# Patient Record
Sex: MALE | Race: WHITE | NOT HISPANIC OR LATINO | Employment: OTHER | URBAN - METROPOLITAN AREA
[De-identification: names, ages, dates, MRNs, and addresses within clinical notes are randomized per-mention and may not be internally consistent; named-entity substitution may affect disease eponyms.]

---

## 2019-08-02 ENCOUNTER — APPOINTMENT (OUTPATIENT)
Dept: GENERAL RADIOLOGY | Facility: HOSPITAL | Age: 24
End: 2019-08-02

## 2019-08-02 ENCOUNTER — HOSPITAL ENCOUNTER (EMERGENCY)
Facility: HOSPITAL | Age: 24
Discharge: HOME | End: 2019-08-02
Attending: EMERGENCY MEDICINE | Admitting: EMERGENCY MEDICINE

## 2019-08-02 VITALS
HEIGHT: 73 IN | BODY MASS INDEX: 22.72 KG/M2 | HEART RATE: 87 BPM | DIASTOLIC BLOOD PRESSURE: 65 MMHG | OXYGEN SATURATION: 98 % | SYSTOLIC BLOOD PRESSURE: 127 MMHG | WEIGHT: 171.4 LBS | RESPIRATION RATE: 20 BRPM | TEMPERATURE: 98.3 F

## 2019-08-02 DIAGNOSIS — S81.812A LACERATION OF LEFT LOWER EXTREMITY, INITIAL ENCOUNTER: Primary | ICD-10-CM

## 2019-08-02 PROCEDURE — 90715 TDAP VACCINE 7 YRS/> IM: CPT

## 2019-08-02 PROCEDURE — 73590 X-RAY EXAM OF LOWER LEG: CPT

## 2019-08-02 PROCEDURE — 25010000002 TDAP 5-2.5-18.5 LF-MCG/0.5 SUSPENSION

## 2019-08-02 PROCEDURE — 12002 RPR S/N/AX/GEN/TRNK2.6-7.5CM: CPT | Performed by: PHYSICIAN ASSISTANT

## 2019-08-02 PROCEDURE — 90471 IMMUNIZATION ADMIN: CPT

## 2019-08-02 PROCEDURE — 99283 EMERGENCY DEPT VISIT LOW MDM: CPT

## 2019-08-02 RX ORDER — NAPROXEN 250 MG/1
500 TABLET ORAL ONCE
Status: COMPLETED | OUTPATIENT
Start: 2019-08-02 | End: 2019-08-02

## 2019-08-02 RX ORDER — NAPROXEN 250 MG/1
TABLET ORAL
Status: DISCONTINUED
Start: 2019-08-02 | End: 2019-08-02 | Stop reason: HOSPADM

## 2019-08-02 RX ORDER — LIDOCAINE HYDROCHLORIDE AND EPINEPHRINE 10; 10 MG/ML; UG/ML
10 INJECTION, SOLUTION INFILTRATION; PERINEURAL ONCE
Status: COMPLETED | OUTPATIENT
Start: 2019-08-02 | End: 2019-08-02

## 2019-08-02 RX ORDER — NAPROXEN 500 MG/1
500 TABLET ORAL 2 TIMES DAILY PRN
Qty: 20 TABLET | Refills: 0 | Status: SHIPPED | OUTPATIENT
Start: 2019-08-02

## 2019-08-02 RX ADMIN — LIDOCAINE HYDROCHLORIDE,EPINEPHRINE BITARTRATE 10 ML: 10; .01 INJECTION, SOLUTION INFILTRATION; PERINEURAL at 13:33

## 2019-08-02 RX ADMIN — NAPROXEN 500 MG: 250 TABLET ORAL at 13:26

## 2019-08-02 RX ADMIN — TETANUS TOXOID, REDUCED DIPHTHERIA TOXOID AND ACELLULAR PERTUSSIS VACCINE, ADSORBED 0.5 ML: 5; 2.5; 8; 8; 2.5 SUSPENSION INTRAMUSCULAR at 12:08

## 2019-08-02 RX ADMIN — Medication: at 13:34

## 2019-08-02 NOTE — DISCHARGE INSTRUCTIONS
Return to the emergency department with worsening symptoms, uncontrolled pain, inability to tolerate oral liquids, fever greater than 101° F not controlled by Tylenol or as needed with emergent concerns.    Keep clean at least twice a day with antibacterial soap and water.  No soaking in water.

## 2019-08-02 NOTE — ED PROVIDER NOTES
Subjective   History of Present Illness  History of Present Illness    Chief complaint: laceration    Location: L lower leg    Quality/Severity:  Stings, moderate    Timing/Duration: 30 min pta    Modifying Factors: none    Associated Symptoms: Denies numbness or tingling.  Denies dizziness.  Denies active bleeding.  Denies difficulty walking.    Narrative: 24-year-old male presents after he was cut by a piece of galvanized sheet metal at a friend's house.  He is not sure if there could be metal pieces in there.  He is not up-to-date on tetanus. he is not anticoagulated.    Review of Systems   Respiratory: Negative.    Cardiovascular: Negative.    Gastrointestinal: Negative.    Musculoskeletal: Negative.    Skin: Positive for wound.   Neurological: Negative.  Negative for numbness.   Hematological: Negative.  Does not bruise/bleed easily.   All other systems reviewed and are negative.      History reviewed. No pertinent past medical history.    No Known Allergies    Past Surgical History:   Procedure Laterality Date   • CLAVICLE SURGERY     • FINGER SURGERY         History reviewed. No pertinent family history.    Social History     Socioeconomic History   • Marital status: Single     Spouse name: Not on file   • Number of children: Not on file   • Years of education: Not on file   • Highest education level: Not on file   Tobacco Use   • Smoking status: Current Every Day Smoker     Packs/day: 1.00     Types: Cigarettes   Substance and Sexual Activity   • Alcohol use: Yes     Alcohol/week: 0.6 oz     Types: 1 Cans of beer per week   • Drug use: No   • Sexual activity: Defer     No current facility-administered medications for this encounter.     Current Outpatient Medications:   •  Omega-3 Fatty Acids (OMEGA-3 FISH OIL PO), Take  by mouth., Disp: , Rfl:         Objective   Physical Exam  Vitals:    08/02/19 1159   BP: 127/65   Pulse: 87   Resp: 20   Temp: 98.3 °F (36.8 °C)   SpO2: 98%     GENERAL: Alert and oriented  ×4.  No apparent distress.  SKIN: Warm, pink and dry.  4 cm curved laceration to the anterior distal lower leg.  No active bleeding.  No erythema or ecchymosis.  HEENT: Atraumatic normocephalic  LUNGS: Clear to auscultation bilaterally without wheezes, rales or rhonchi  CARDIAC: Regular rate and rhythm.  S1 and S2.  No murmurs, rubs or gallops.  ABD: Soft, nontender  M/S: MAEW, no deformity  PSYCH: Normal mood and affect    Laceration Repair  Date/Time: 8/2/2019 1:00 PM  Performed by: Ashwini Price PA-C  Authorized by: Arya Benz MD     Consent:     Consent obtained:  Verbal    Consent given by:  Patient    Risks discussed:  Infection, need for additional repair, nerve damage, poor wound healing, poor cosmetic result, pain, retained foreign body, tendon damage and vascular damage    Alternatives discussed:  Observation, referral, delayed treatment and no treatment  Anesthesia (see MAR for exact dosages):     Anesthesia method:  Local infiltration    Local anesthetic:  Lidocaine 1% WITH epi  Laceration details:     Location:  Leg    Leg location:  L lower leg    Length (cm):  4    Depth (mm):  3  Repair type:     Repair type:  Simple  Pre-procedure details:     Preparation:  Patient was prepped and draped in usual sterile fashion and imaging obtained to evaluate for foreign bodies  Exploration:     Hemostasis achieved with:  Direct pressure and epinephrine    Wound exploration: wound explored through full range of motion and entire depth of wound probed and visualized      Wound extent: no fascia violation noted, no foreign bodies/material noted, no muscle damage noted, no nerve damage noted, no tendon damage noted, no underlying fracture noted and no vascular damage noted      Contaminated: no    Treatment:     Wound cleansed with: chlorhexadine.    Amount of cleaning:  Standard    Irrigation solution:  Sterile saline    Irrigation volume:  450    Irrigation method:  Pressure wash  Skin repair:     Repair  method:  Sutures    Suture size:  4-0    Suture material:  Nylon    Suture technique:  Simple interrupted    Number of sutures:  11  Approximation:     Approximation:  Close  Post-procedure details:     Dressing:  Non-adherent dressing and antibiotic ointment    Patient tolerance of procedure:  Tolerated well, no immediate complications                 ED Course    Tdap given    Reviewed tib/fib xray. Independently viewed by me. Interpreted by radiologist. Discussed with pt.  Xr Tibia Fibula 2 View Left    Result Date: 8/2/2019  Narrative: XR TIBIA FIBULA 2 VW LEFT-: 8/2/2019 12:30 PM  INDICATION: Laceration to lower leg from a piece of metal 30 minutes ago.  COMPARISON: None available.  FINDINGS: 2 views of the left tibia/fibula.  No fracture or dislocation.  No bone erosion or destruction. Articulation at the knee and ankle is anatomic..  No foreign body. There is a large laceration visible anteriorly and from the distal tibia      Impression: Soft tissue laceration is visible in the distal anterior soft tissues otherwise study is normal.  This report was finalized on 8/2/2019 12:57 PM by Dr. Abiodun Nguyen MD.      Education given.    Discussed pertinent labs and imaging findings with the patient/family.  Patient/Family voiced understanding of need to follow-up for recheck, further testing as needed.  Return to the emergency Department warnings were given.              MDM  Number of Diagnoses or Management Options  Laceration of left lower extremity, initial encounter: new and requires workup     Amount and/or Complexity of Data Reviewed  Tests in the radiology section of CPT®: ordered and reviewed  Tests in the medicine section of CPT®: reviewed and ordered  Independent visualization of images, tracings, or specimens: yes    Risk of Complications, Morbidity, and/or Mortality  Presenting problems: low  Diagnostic procedures: low  Management options: moderate    Patient Progress  Patient progress:  improved        Final diagnoses:   Laceration of left lower extremity, initial encounter     Dictated utilizing Dragon dictation         Ashwini Price PA-C  08/02/19 1860